# Patient Record
Sex: FEMALE | Race: WHITE
[De-identification: names, ages, dates, MRNs, and addresses within clinical notes are randomized per-mention and may not be internally consistent; named-entity substitution may affect disease eponyms.]

---

## 2017-01-25 ENCOUNTER — APPOINTMENT (OUTPATIENT)
Dept: RHEUMATOLOGY | Facility: CLINIC | Age: 27
End: 2017-01-25

## 2019-02-25 ENCOUNTER — APPOINTMENT (OUTPATIENT)
Dept: ENDOCRINOLOGY | Facility: CLINIC | Age: 29
End: 2019-02-25
Payer: COMMERCIAL

## 2019-02-25 VITALS
DIASTOLIC BLOOD PRESSURE: 80 MMHG | SYSTOLIC BLOOD PRESSURE: 120 MMHG | HEIGHT: 64 IN | BODY MASS INDEX: 28.17 KG/M2 | OXYGEN SATURATION: 98 % | HEART RATE: 70 BPM | WEIGHT: 165 LBS

## 2019-02-25 DIAGNOSIS — Z82.3 FAMILY HISTORY OF STROKE: ICD-10-CM

## 2019-02-25 DIAGNOSIS — Z83.49 FAMILY HISTORY OF OTHER ENDOCRINE, NUTRITIONAL AND METABOLIC DISEASES: ICD-10-CM

## 2019-02-25 DIAGNOSIS — Z78.9 OTHER SPECIFIED HEALTH STATUS: ICD-10-CM

## 2019-02-25 DIAGNOSIS — Z82.49 FAMILY HISTORY OF ISCHEMIC HEART DISEASE AND OTHER DISEASES OF THE CIRCULATORY SYSTEM: ICD-10-CM

## 2019-02-25 DIAGNOSIS — K59.00 CONSTIPATION, UNSPECIFIED: ICD-10-CM

## 2019-02-25 PROCEDURE — 99244 OFF/OP CNSLTJ NEW/EST MOD 40: CPT

## 2019-02-25 NOTE — HISTORY OF PRESENT ILLNESS
[FreeTextEntry1] : 28 y.o. female with h/o hypothyroidism diagnosed in 2013 presents for evaluation. At the time of diagnosis was having severe fatigue and hip pain. Not aware of AITD. However does have a sister with Hashimoto's disease. Also reports prior history of positive ALEKSANDER and saw rheumatology for evaluation. In regards to treatment, reports dose being tapered slowly over the years from 50 mcg daily to 100 mcg daily. Currently taking Synthroid 100 mcg daily on empty stomach and waits 1 hour before eating. C/o abdominal bloating and constipation. Otherwise reports good energy since starting Lexapro for anxiety/depression. Reports slow weight gain over the last few years. No heat or cold intolerance. No skin complaints. No neck complaints including dysphagia and dysphonia.

## 2019-02-25 NOTE — ASSESSMENT
[Levothyroxine] : The patient was instructed to take Levothyroxine on an empty stomach, separate from vitamins, and wait at least 30 minutes before eating [FreeTextEntry1] : 28 y.o. female with h/o hypothyroidism and constipation.\par 1.  Hypothyroidism- Discussed pathophysiology and suspect Hashimoto's disease. Patient appears euthyroid. Will check TFTs and antithyroid antibodies. Will adjust T4 accordingly to achieve goal TSH of below 2.5 to 3. Discussed thyroid disease and pregnancy. Given cost of BRAND name, will consider switch to generic with repeat TFTs in 8 weeks to assure stability. Will check thyroid ultrasound to evaluate gland.\par 2. Constipation/abdominal bloating- Encouraged hydration and increasing fiber intake. Will screen for celiac disease. Will check CMP and 25 vitamin D level.\par \par If stable, follow up in 4 months

## 2019-02-25 NOTE — CONSULT LETTER
[Dear  ___] : Dear  [unfilled], [Consult Letter:] : I had the pleasure of evaluating your patient, [unfilled]. [( Thank you for referring [unfilled] for consultation for _____ )] : Thank you for referring [unfilled] for consultation for [unfilled] [Please see my note below.] : Please see my note below. [Consult Closing:] : Thank you very much for allowing me to participate in the care of this patient.  If you have any questions, please do not hesitate to contact me. [Sincerely,] : Sincerely, [FreeTextEntry2] : Russel Rodgers DO\par [address]\[place] [address] [FreeTextEntry3] : Pankaj Osborne DO\par  of Medicine\par [facility] [employer] [facility]\par

## 2019-02-25 NOTE — PHYSICAL EXAM
[Alert] : alert [No Acute Distress] : no acute distress [Normal Sclera/Conjunctiva] : normal sclera/conjunctiva [EOMI] : extra ocular movement intact [Supple] : the neck was supple [No LAD] : no lymphadenopathy [Thyroid Not Enlarged] : the thyroid was not enlarged [No Accessory Muscle Use] : no accessory muscle use [Clear to Auscultation] : lungs were clear to auscultation bilaterally [Normal S1, S2] : normal S1 and S2 [Regular Rhythm] : with a regular rhythm [No Edema] : there was no peripheral edema [Normal Bowel Sounds] : normal bowel sounds [Not Tender] : non-tender [Soft] : abdomen soft [Not Distended] : not distended [No Clubbing, Cyanosis] : no clubbing  or cyanosis of the fingernails [No Rash] : no rash [Normal Reflexes] : deep tendon reflexes were 2+ and symmetric [Normal Affect] : the affect was normal [Normal Mood] : the mood was normal [No Thyroid Nodules] : there were no palpable thyroid nodules [No Stigmata of Cushings Syndrome] : no stigmata of cushings syndrome

## 2019-02-25 NOTE — REVIEW OF SYSTEMS
[Recent Weight Gain (___ Lbs)] : recent [unfilled] ~Ulb weight gain [Constipation] : constipation [Gas/Bloating] : gas/bloating [Negative] : Integumentary [Fatigue] : no fatigue [Polyuria] : no polyuria [Irregular Menses] : regular menses [Depression] : no depression [Anxiety] : no anxiety [Polydipsia] : no polydipsia [Cold Intolerance] : cold tolerant [Heat Intolerance] : heat tolerant [Swelling] : no swelling [de-identified] : twitching

## 2019-02-27 LAB
25(OH)D3 SERPL-MCNC: 37.9 NG/ML
ALBUMIN SERPL ELPH-MCNC: 4.4 G/DL
ALP BLD-CCNC: 55 U/L
ALT SERPL-CCNC: 7 U/L
ANION GAP SERPL CALC-SCNC: 11 MMOL/L
AST SERPL-CCNC: 14 U/L
BASOPHILS # BLD AUTO: 0.05 K/UL
BASOPHILS NFR BLD AUTO: 0.7 %
BILIRUB SERPL-MCNC: 0.2 MG/DL
BUN SERPL-MCNC: 13 MG/DL
CALCIUM SERPL-MCNC: 9.3 MG/DL
CHLORIDE SERPL-SCNC: 105 MMOL/L
CO2 SERPL-SCNC: 26 MMOL/L
CREAT SERPL-MCNC: 0.97 MG/DL
EOSINOPHIL # BLD AUTO: 0.2 K/UL
EOSINOPHIL NFR BLD AUTO: 2.8 %
GLUCOSE SERPL-MCNC: 93 MG/DL
HCT VFR BLD CALC: 41.8 %
HGB BLD-MCNC: 13.8 G/DL
IMM GRANULOCYTES NFR BLD AUTO: 0.1 %
LYMPHOCYTES # BLD AUTO: 2.9 K/UL
LYMPHOCYTES NFR BLD AUTO: 40.9 %
MAN DIFF?: NORMAL
MCHC RBC-ENTMCNC: 30.1 PG
MCHC RBC-ENTMCNC: 33 GM/DL
MCV RBC AUTO: 91.3 FL
MONOCYTES # BLD AUTO: 0.43 K/UL
MONOCYTES NFR BLD AUTO: 6.1 %
NEUTROPHILS # BLD AUTO: 3.5 K/UL
NEUTROPHILS NFR BLD AUTO: 49.4 %
PLATELET # BLD AUTO: 319 K/UL
POTASSIUM SERPL-SCNC: 4.9 MMOL/L
PROT SERPL-MCNC: 7 G/DL
RBC # BLD: 4.58 M/UL
RBC # FLD: 11.9 %
SODIUM SERPL-SCNC: 142 MMOL/L
T4 FREE SERPL-MCNC: 1.4 NG/DL
THYROGLOB AB SERPL-ACNC: <20 IU/ML
THYROPEROXIDASE AB SERPL IA-ACNC: 250 IU/ML
TSH SERPL-ACNC: 3.47 UIU/ML
TTG IGA SER IA-ACNC: <1.2 U/ML
TTG IGA SER-ACNC: NEGATIVE
TTG IGG SER IA-ACNC: 7.3 U/ML
TTG IGG SER IA-ACNC: ABNORMAL
WBC # FLD AUTO: 7.09 K/UL

## 2019-03-15 ENCOUNTER — APPOINTMENT (OUTPATIENT)
Dept: GASTROENTEROLOGY | Facility: CLINIC | Age: 29
End: 2019-03-15
Payer: COMMERCIAL

## 2019-03-15 VITALS
HEART RATE: 79 BPM | TEMPERATURE: 98.8 F | WEIGHT: 160 LBS | HEIGHT: 64 IN | SYSTOLIC BLOOD PRESSURE: 108 MMHG | DIASTOLIC BLOOD PRESSURE: 80 MMHG | BODY MASS INDEX: 27.31 KG/M2 | OXYGEN SATURATION: 97 %

## 2019-03-15 DIAGNOSIS — R14.0 ABDOMINAL DISTENSION (GASEOUS): ICD-10-CM

## 2019-03-15 DIAGNOSIS — K59.09 OTHER CONSTIPATION: ICD-10-CM

## 2019-03-15 DIAGNOSIS — Z78.9 OTHER SPECIFIED HEALTH STATUS: ICD-10-CM

## 2019-03-15 DIAGNOSIS — R12 HEARTBURN: ICD-10-CM

## 2019-03-15 DIAGNOSIS — F32.9 ANXIETY DISORDER, UNSPECIFIED: ICD-10-CM

## 2019-03-15 DIAGNOSIS — R19.8 OTHER SPECIFIED SYMPTOMS AND SIGNS INVOLVING THE DIGESTIVE SYSTEM AND ABDOMEN: ICD-10-CM

## 2019-03-15 DIAGNOSIS — F41.9 ANXIETY DISORDER, UNSPECIFIED: ICD-10-CM

## 2019-03-15 PROCEDURE — 99203 OFFICE O/P NEW LOW 30 MIN: CPT

## 2019-03-15 NOTE — PHYSICAL EXAM
[General Appearance - Alert] : alert [General Appearance - In No Acute Distress] : in no acute distress [General Appearance - Well Nourished] : well nourished [General Appearance - Well Developed] : well developed [General Appearance - Well-Appearing] : healthy appearing [Sclera] : the sclera and conjunctiva were normal [Neck Appearance] : the appearance of the neck was normal [Neck Cervical Mass (___cm)] : no neck mass was observed [Jugular Venous Distention Increased] : there was no jugular-venous distention [Auscultation Breath Sounds / Voice Sounds] : lungs were clear to auscultation bilaterally [Apical Impulse] : the apical impulse was normal [Heart Rate And Rhythm] : heart rate was normal and rhythm regular [Full Pulse] : the pedal pulses are present [Edema] : there was no peripheral edema [Bowel Sounds] : normal bowel sounds [Abdomen Soft] : soft [Abdomen Tenderness] : non-tender [] : no hepato-splenomegaly [Abdomen Mass (___ Cm)] : no abdominal mass palpated [Cervical Lymph Nodes Enlarged Posterior Bilaterally] : posterior cervical [Cervical Lymph Nodes Enlarged Anterior Bilaterally] : anterior cervical [Supraclavicular Lymph Nodes Enlarged Bilaterally] : supraclavicular [Axillary Lymph Nodes Enlarged Bilaterally] : axillary [Femoral Lymph Nodes Enlarged Bilaterally] : femoral [Inguinal Lymph Nodes Enlarged Bilaterally] : inguinal [No CVA Tenderness] : no ~M costovertebral angle tenderness [No Spinal Tenderness] : no spinal tenderness [Abnormal Walk] : normal gait [Nail Clubbing] : no clubbing  or cyanosis of the fingernails [Musculoskeletal - Swelling] : no joint swelling seen [Motor Tone] : muscle strength and tone were normal [No Focal Deficits] : no focal deficits [Oriented To Time, Place, And Person] : oriented to person, place, and time [Impaired Insight] : insight and judgment were intact [Affect] : the affect was normal

## 2019-03-15 NOTE — HISTORY OF PRESENT ILLNESS
[de-identified] : Dr. Russel lisa takes care of this pleasant 28-year-old female. She's been having quite a number of months of constipation with alterations of loose bowel movements. Sometimes the constipation loose bowel movements can occur in the same day. His abdominal bloating. She says she actually has to keep to sizes of clothes because of variations in her abdominal girth. She passes some flatus. But often feels like she cannot get the gas out. This occasional heartburn. There is no dysphagia or odynophagia. No nausea or vomiting. No true abdominal pain. Her bowel movements were loose not bloody. As no mucus. She had a tissue transglutaminase that was minimally elevated. No family history of celiac disease that she is aware of. No family history of inflammatory bowel disease. No family history of intestinal cancers. She has lost no weight.

## 2019-03-15 NOTE — REASON FOR VISIT
[Initial Evaluation] : an initial evaluation [FreeTextEntry1] : Alteration of loose bowel movements and constipation, abdominal bloating and gas, occasional heartburn and a mildly elevated antibody for celiac

## 2019-04-04 ENCOUNTER — RESULT REVIEW (OUTPATIENT)
Age: 29
End: 2019-04-04

## 2019-04-19 ENCOUNTER — OTHER (OUTPATIENT)
Age: 29
End: 2019-04-19

## 2019-04-22 ENCOUNTER — OTHER (OUTPATIENT)
Age: 29
End: 2019-04-22

## 2019-04-25 ENCOUNTER — APPOINTMENT (OUTPATIENT)
Dept: GASTROENTEROLOGY | Facility: AMBULATORY MEDICAL SERVICES | Age: 29
End: 2019-04-25
Payer: COMMERCIAL

## 2019-04-25 ENCOUNTER — OTHER (OUTPATIENT)
Age: 29
End: 2019-04-25

## 2019-04-25 DIAGNOSIS — K29.00 ACUTE GASTRITIS W/OUT BLEEDING: ICD-10-CM

## 2019-04-25 PROCEDURE — 43235 EGD DIAGNOSTIC BRUSH WASH: CPT

## 2019-04-27 RX ORDER — RANITIDINE 300 MG/1
300 TABLET ORAL
Qty: 30 | Refills: 2 | Status: DISCONTINUED | COMMUNITY
Start: 2019-04-25 | End: 2019-04-27

## 2019-04-29 ENCOUNTER — OTHER (OUTPATIENT)
Age: 29
End: 2019-04-29

## 2019-05-13 ENCOUNTER — TRANSCRIPTION ENCOUNTER (OUTPATIENT)
Age: 29
End: 2019-05-13

## 2019-08-07 ENCOUNTER — MEDICATION RENEWAL (OUTPATIENT)
Age: 29
End: 2019-08-07

## 2019-09-11 NOTE — CONSULT LETTER
What Type Of Note Output Would You Prefer (Optional)?: Standard Output Hpi Title: Evaluation of Skin Lesions [Dear  ___] : Dear  [unfilled], How Severe Are Your Spot(S)?: mild [Consult Letter:] : I had the pleasure of evaluating your patient, [unfilled]. Have Your Spot(S) Been Treated In The Past?: has not been treated [Please see my note below.] : Please see my note below. Year Removed: 1900 [Consult Closing:] : Thank you very much for allowing me to participate in the care of this patient.  If you have any questions, please do not hesitate to contact me. Additional History: She would also like a refill of spironolactone 100 mg QD, which she has been taking for years with improvement and no adverse effects. She has no blood pressure abnormalities and no kidney or electrolyte abnormalities. She is post-menopausal. [Sincerely,] : Sincerely, [FreeTextEntry2] : Russel lisa MD\par [address] [place] [address]\[place] [address]\par  [FreeTextEntry3] : Winston Díaz MD\par

## 2019-11-26 ENCOUNTER — APPOINTMENT (OUTPATIENT)
Dept: ENDOCRINOLOGY | Facility: CLINIC | Age: 29
End: 2019-11-26
Payer: COMMERCIAL

## 2019-11-26 VITALS
TEMPERATURE: 98.4 F | WEIGHT: 160 LBS | HEART RATE: 72 BPM | OXYGEN SATURATION: 95 % | BODY MASS INDEX: 27.31 KG/M2 | SYSTOLIC BLOOD PRESSURE: 114 MMHG | DIASTOLIC BLOOD PRESSURE: 78 MMHG | HEIGHT: 64 IN

## 2019-11-26 PROCEDURE — 99214 OFFICE O/P EST MOD 30 MIN: CPT | Mod: 25

## 2019-11-26 PROCEDURE — 36415 COLL VENOUS BLD VENIPUNCTURE: CPT

## 2019-11-26 NOTE — PHYSICAL EXAM
[No Acute Distress] : no acute distress [Alert] : alert [Normal Sclera/Conjunctiva] : normal sclera/conjunctiva [No LAD] : no lymphadenopathy [EOMI] : extra ocular movement intact [Supple] : the neck was supple [Thyroid Not Enlarged] : the thyroid was not enlarged [No Accessory Muscle Use] : no accessory muscle use [Normal S1, S2] : normal S1 and S2 [Clear to Auscultation] : lungs were clear to auscultation bilaterally [No Edema] : there was no peripheral edema [Regular Rhythm] : with a regular rhythm [Not Tender] : non-tender [Soft] : abdomen soft [Normal Bowel Sounds] : normal bowel sounds [Not Distended] : not distended [No Clubbing, Cyanosis] : no clubbing  or cyanosis of the fingernails [No Stigmata of Cushings Syndrome] : no stigmata of cushings syndrome [No Rash] : no rash [Normal Reflexes] : deep tendon reflexes were 2+ and symmetric [Normal Mood] : the mood was normal [Normal Affect] : the affect was normal

## 2019-11-26 NOTE — REVIEW OF SYSTEMS
[Fatigue] : fatigue [Recent Weight Loss (___ Lbs)] : recent [unfilled] ~Ulb weight loss [Constipation] : constipation [Negative] : Integumentary [Recent Weight Gain (___ Lbs)] : no recent weight gain [Gas/Bloating] : no gas/bloating [Polyuria] : no polyuria [Irregular Menses] : irregular menses [Depression] : no depression [Anxiety] : no anxiety [Polydipsia] : no polydipsia [Cold Intolerance] : cold tolerant [Heat Intolerance] : heat tolerant [Swelling] : no swelling

## 2019-11-26 NOTE — ASSESSMENT
[Levothyroxine] : The patient was instructed to take Levothyroxine on an empty stomach, separate from vitamins, and wait at least 30 minutes before eating [FreeTextEntry1] : 29 y.o. female with h/o hypothyroidism/Hashimoto's disease and thyroid nodules.\par 1.  Hypothyroidism/Hashimoto's disease- Patient appears euthyroid. Will check TFTs. Will adjust T4 accordingly to achieve goal TSH of below 2.5 to 3. Discussed thyroid disease and pregnancy. \par 2. Thyroid nodules- Will check thyroid ultrasound to evaluate. If nodules remain stable, will monitor for now. \par \par If stable, follow up in 6 months

## 2019-11-26 NOTE — HISTORY OF PRESENT ILLNESS
[FreeTextEntry1] : 29 y.o. female with h/o hypothyroidism/Hashimoto's disease diagnosed in 2013 presents for follow up visit.  At the time of diagnosis was having severe fatigue and hip pain. Does have a sister with Hashimoto's disease. Also reports prior history of positive ALEKSANDER and saw rheumatology for evaluation. In regards to treatment, reports dose being increased slowly over the years from 50 mcg daily to 100 mcg daily. Currently taking LT4 112 mcg daily on empty stomach and waits 1 hour before eating. Reports increase in appetite. Reports good energy since starting Lexapro for anxiety/depression but reports increase in fatigue now. Reports slow weight loss since last visit. No exercise. No heat or cold intolerance. No skin complaints. No neck complaints including dysphagia and dysphonia. Reports menstrual cycle is irregular without no bleeding for 4 to 6 months despite taking OCPs. Had EGD on 4/25/19 with negative biopsies for celiac disease and H. pylori and diagnosed with mild gastritis. \par \par In regards to thyroid nodules diagnosed in March 2019. Thyroid ultrasound on 3/7/19 showed normal sized gland with heterogenous echotexture. In the right lobe there is a 1.0 cm hyperechoic mid pole nodule and a 0.7 cm lower pole hypoechoic nodule. On the left there is a upper to mid pole 1.4 cm hypoechoic nodule and a mid to lower pole 0.9 cm hypoechoic nodule. No neck complaints.

## 2019-11-27 LAB
T4 FREE SERPL-MCNC: 1.3 NG/DL
TSH SERPL-ACNC: 3.89 UIU/ML

## 2020-02-20 ENCOUNTER — RX RENEWAL (OUTPATIENT)
Age: 30
End: 2020-02-20

## 2020-05-12 ENCOUNTER — APPOINTMENT (OUTPATIENT)
Dept: ENDOCRINOLOGY | Facility: CLINIC | Age: 30
End: 2020-05-12

## 2020-05-12 ENCOUNTER — RX RENEWAL (OUTPATIENT)
Age: 30
End: 2020-05-12

## 2020-08-07 ENCOUNTER — RX RENEWAL (OUTPATIENT)
Age: 30
End: 2020-08-07

## 2020-12-07 ENCOUNTER — RX RENEWAL (OUTPATIENT)
Age: 30
End: 2020-12-07

## 2021-03-21 ENCOUNTER — TRANSCRIPTION ENCOUNTER (OUTPATIENT)
Age: 31
End: 2021-03-21

## 2021-05-03 ENCOUNTER — APPOINTMENT (OUTPATIENT)
Dept: ENDOCRINOLOGY | Facility: CLINIC | Age: 31
End: 2021-05-03
Payer: COMMERCIAL

## 2021-05-03 VITALS
BODY MASS INDEX: 28.17 KG/M2 | HEART RATE: 64 BPM | OXYGEN SATURATION: 97 % | SYSTOLIC BLOOD PRESSURE: 113 MMHG | HEIGHT: 64 IN | TEMPERATURE: 98.3 F | DIASTOLIC BLOOD PRESSURE: 76 MMHG | WEIGHT: 165 LBS

## 2021-05-03 PROCEDURE — 99213 OFFICE O/P EST LOW 20 MIN: CPT

## 2021-05-03 PROCEDURE — 99072 ADDL SUPL MATRL&STAF TM PHE: CPT

## 2021-05-03 RX ORDER — RANITIDINE 150 MG/1
150 TABLET ORAL
Qty: 60 | Refills: 5 | Status: DISCONTINUED | COMMUNITY
Start: 2019-04-27 | End: 2021-05-03

## 2021-05-03 RX ORDER — FLUTICASONE PROPIONATE 50 UG/1
50 SPRAY, METERED NASAL
Qty: 16 | Refills: 0 | Status: ACTIVE | COMMUNITY
Start: 2021-02-08

## 2021-05-03 NOTE — DATA REVIEWED
[FreeTextEntry1] : Labs\par 4/8/21\par H/H 14.2/44.4\par glucose 87\par BUN/cr 14/0.93\par calcium 9.9\par TSH 2.91 Free T4 1.3\par

## 2021-05-03 NOTE — HISTORY OF PRESENT ILLNESS
[FreeTextEntry1] : 31 y.o. female with h/o hypothyroidism/Hashimoto's disease diagnosed in 2013 presents for follow up visit.  At the time of diagnosis was having severe fatigue and hip pain. Does have a sister with Hashimoto's disease. Also reports prior history of positive ALEKSANDER and saw rheumatology for evaluation. In regards to treatment, reports dose being increased slowly over the years from 50 mcg daily to 100 mcg daily. Currently taking LT4 112 mcg daily on empty stomach and waits 1 hour before eating. Reports good energy since starting Lexapro for anxiety/depression. Reports weight is stable since last visit. No exercise but does walking. No heat or cold intolerance. No skin complaints. No neck complaints including dysphagia and dysphonia. Off OCPs and mood is better. Menstrual cycles are regular and does get cramps. Had EGD on 4/25/19 with negative biopsies for celiac disease and H. pylori and diagnosed with mild gastritis. Reports less abdominal pain and bloating. Better since eliminating dairy. \par \par In regards to thyroid nodules diagnosed in March 2019. Thyroid ultrasound on 3/7/19 showed normal sized gland with heterogenous echotexture. In the right lobe there is a 1.0 cm hyperechoic mid pole nodule and a 0.7 cm lower pole hypoechoic nodule. On the left there is a upper to mid pole 1.4 cm hypoechoic nodule and a mid to lower pole 0.9 cm hypoechoic nodule. No neck complaints. Thyroid ultrasound in August 2020 shows right mid pole nodule 1.2 cm and right lower pole nodule 0.7 cm and left upper pole nodule 1.3 cm and left lower pole nodule 0.9 cm. No abnormal LNs.

## 2021-05-03 NOTE — REVIEW OF SYSTEMS
[Fatigue] : no fatigue [Recent Weight Gain (___ Lbs)] : no recent weight gain [Recent Weight Loss (___ Lbs)] : no recent weight loss [Constipation] : no constipation [Abdominal Pain] : no abdominal pain [Diarrhea] : no diarrhea [Gas/Bloating] : no gas/bloating [Irregular Menses] : regular menses [Joint Pain] : no joint pain [Dry Skin] : no dry skin [Hair Loss] : no hair loss [Headaches] : no headaches [Polydipsia] : no polydipsia [Cold Intolerance] : no cold intolerance [Heat Intolerance] : no heat intolerance [Swelling] : no swelling [Negative] : Psychiatric

## 2021-05-03 NOTE — ASSESSMENT
[Levothyroxine] : The patient was instructed to take Levothyroxine on an empty stomach, separate from vitamins, and wait at least 30 minutes before eating [FreeTextEntry1] : 31 y.o. female with h/o hypothyroidism/Hashimoto's disease and thyroid nodules.\par \par 1.  Hypothyroidism/Hashimoto's disease- Patient is euthyroid. Will continue LT4 112 mcg daily. Discussed thyroid disease and pregnancy. \par \par 2. Thyroid nodules- Thyroid nodules are stable. Will continue to monitor for now. Will check thyroid ultrasound in 6 months. \par \par If stable, follow up in 6 months

## 2021-05-03 NOTE — PHYSICAL EXAM
[Alert] : alert [No Acute Distress] : no acute distress [Normal Sclera/Conjunctiva] : normal sclera/conjunctiva [EOMI] : extra ocular movement intact [No LAD] : no lymphadenopathy [Thyroid Not Enlarged] : the thyroid was not enlarged [No Respiratory Distress] : no respiratory distress [Clear to Auscultation] : lungs were clear to auscultation bilaterally [Normal S1, S2] : normal S1 and S2 [Regular Rhythm] : with a regular rhythm [No Edema] : no peripheral edema [Normal Bowel Sounds] : normal bowel sounds [Not Tender] : non-tender [Not Distended] : not distended [Soft] : abdomen soft [Normal Anterior Cervical Nodes] : no anterior cervical lymphadenopathy [No Clubbing, Cyanosis] : no clubbing  or cyanosis of the fingernails [No Rash] : no rash [Normal Reflexes] : deep tendon reflexes were 2+ and symmetric [Normal Affect] : the affect was normal [Normal Mood] : the mood was normal

## 2021-07-18 ENCOUNTER — RX RENEWAL (OUTPATIENT)
Age: 31
End: 2021-07-18

## 2021-10-11 ENCOUNTER — APPOINTMENT (OUTPATIENT)
Dept: ENDOCRINOLOGY | Facility: CLINIC | Age: 31
End: 2021-10-11
Payer: COMMERCIAL

## 2021-10-11 VITALS
HEIGHT: 64 IN | BODY MASS INDEX: 28.42 KG/M2 | TEMPERATURE: 98.8 F | SYSTOLIC BLOOD PRESSURE: 112 MMHG | OXYGEN SATURATION: 97 % | HEART RATE: 77 BPM | DIASTOLIC BLOOD PRESSURE: 74 MMHG | WEIGHT: 166.44 LBS

## 2021-10-11 DIAGNOSIS — E03.9 HYPOTHYROIDISM, UNSPECIFIED: ICD-10-CM

## 2021-10-11 PROCEDURE — 36415 COLL VENOUS BLD VENIPUNCTURE: CPT

## 2021-10-11 PROCEDURE — 99213 OFFICE O/P EST LOW 20 MIN: CPT | Mod: 25

## 2021-10-11 RX ORDER — AZELASTINE HYDROCHLORIDE 0.5 MG/ML
0.05 SOLUTION/ DROPS OPHTHALMIC
Qty: 6 | Refills: 0 | Status: DISCONTINUED | COMMUNITY
Start: 2021-06-09

## 2021-10-11 NOTE — REVIEW OF SYSTEMS
[Negative] : Respiratory [Fatigue] : no fatigue [Recent Weight Gain (___ Lbs)] : no recent weight gain [Recent Weight Loss (___ Lbs)] : no recent weight loss [Constipation] : no constipation [Abdominal Pain] : no abdominal pain [Diarrhea] : no diarrhea [Gas/Bloating] : no gas/bloating [Irregular Menses] : regular menses [Joint Pain] : no joint pain [Dry Skin] : no dry skin [Hair Loss] : no hair loss [Headaches] : no headaches [Anxiety] : anxiety [Polydipsia] : no polydipsia [Cold Intolerance] : no cold intolerance [Heat Intolerance] : no heat intolerance [Swelling] : no swelling

## 2021-10-11 NOTE — ASSESSMENT
[Levothyroxine] : The patient was instructed to take Levothyroxine on an empty stomach, separate from vitamins, and wait at least 30 minutes before eating [FreeTextEntry1] : 31 y.o. female with h/o hypothyroidism/Hashimoto's disease and thyroid nodules.\par \par 1.  Hypothyroidism/Hashimoto's disease- Patient appears euthyroid. Will continue LT4 112 mcg daily for now. Will check TFTs today. \par \par 2. Thyroid nodules- Thyroid nodules have been stable. Will continue to monitor for now. Will check thyroid ultrasound now. \par \par If stable, follow up in 6 months\par Labs drawn in the office

## 2021-10-11 NOTE — HISTORY OF PRESENT ILLNESS
[FreeTextEntry1] : 31 y.o. female with h/o hypothyroidism/Hashimoto's disease diagnosed in 2013 presents for follow up visit.  At the time of diagnosis was having severe fatigue and hip pain. Does have a sister with Hashimoto's disease. Also reports prior history of positive ALEKSANDER and saw rheumatology for evaluation. In regards to treatment, reports dose being increased slowly over the years from 50 mcg daily to 112 mcg daily. Currently taking LT4 112 mcg daily on empty stomach and waits 1 hour before eating. Reports good energy since starting Lexapro for anxiety/depression. Reports increase in anxiety secondary to mother's passing. Reports weight is stable since last visit. No exercise but does walking. No heat or cold intolerance. No skin complaints. Normal bowel movements. No neck complaints including dysphagia and dysphonia. Off OCPs and mood is better. Menstrual cycles are regular and does get cramps. Had EGD on 4/25/19 with negative biopsies for celiac disease and H. pylori and diagnosed with mild gastritis. Reports less abdominal pain and bloating when eliminating dairy and gluten. \par \par In regards to thyroid nodules diagnosed in March 2019. Thyroid ultrasound on 3/7/19 showed normal sized gland with heterogenous echotexture. In the right lobe there is a 1.0 cm hyperechoic mid pole nodule and a 0.7 cm lower pole hypoechoic nodule. On the left there is a upper to mid pole 1.4 cm hypoechoic nodule and a mid to lower pole 0.9 cm hypoechoic nodule. No neck complaints. Thyroid ultrasound in August 2020 shows right mid pole nodule 1.2 cm and right lower pole nodule 0.7 cm and left upper pole nodule 1.3 cm and left lower pole nodule 0.9 cm. No abnormal LNs.

## 2021-10-12 LAB
T4 FREE SERPL-MCNC: 1.4 NG/DL
TSH SERPL-ACNC: 1.14 UIU/ML

## 2022-01-31 NOTE — ASSESSMENT
[FreeTextEntry1] : Impression\par \par Ulceration in bowel movement with periods of constipation and loose bowel movements, sometimes in the same day\par \par Abdominal bloating\par \par Heartburn\par \par Mildly elevated tissue transglutaminase\par \par Suggest\par \par Try low simethicone\par \par Trial of probiotic\par \par Trial of Equalactin tablets\par \par Upper endoscopy with small bowel biopsies\par \par Colonoscopy with biopsies to look for lymphocytic and collagenous colitis\par \par Suprep\par \par Risks/benefits:\par The procedure, the risks and benefits and alternatives have been reviewed in great detail with the patient.  Risks including, but not limited to sedation such as cardiac and pulmonary compromise, the procedure itself such as bleeding requiring hospitalization, transfusion, surgery, temporary or permanent colostomy.  Perforation or puncture of the requiring hospitalization, surgery, temporary colostomy.\par It has been explained to the patient that though colonoscopy is thought to be the best screening exam for colon cancer and polyps, no screening exam can find all colon polyps or cancers.  \par The patient expresses understanding of the procedure and consents to undergoing the procedure.\par \par 
some shortness of breath at baseline related to her asthma. Otherwise ROS is unremarkable

## 2022-02-11 ENCOUNTER — RX RENEWAL (OUTPATIENT)
Age: 32
End: 2022-02-11

## 2022-08-04 ENCOUNTER — RX RENEWAL (OUTPATIENT)
Age: 32
End: 2022-08-04

## 2022-08-23 ENCOUNTER — APPOINTMENT (OUTPATIENT)
Dept: ENDOCRINOLOGY | Facility: CLINIC | Age: 32
End: 2022-08-23

## 2022-08-23 VITALS
BODY MASS INDEX: 28.51 KG/M2 | WEIGHT: 167 LBS | OXYGEN SATURATION: 98 % | DIASTOLIC BLOOD PRESSURE: 74 MMHG | HEIGHT: 64 IN | TEMPERATURE: 97.8 F | SYSTOLIC BLOOD PRESSURE: 107 MMHG | HEART RATE: 71 BPM

## 2022-08-23 DIAGNOSIS — E06.3 AUTOIMMUNE THYROIDITIS: ICD-10-CM

## 2022-08-23 DIAGNOSIS — E04.2 NONTOXIC MULTINODULAR GOITER: ICD-10-CM

## 2022-08-23 PROCEDURE — 36415 COLL VENOUS BLD VENIPUNCTURE: CPT

## 2022-08-23 PROCEDURE — 99213 OFFICE O/P EST LOW 20 MIN: CPT | Mod: 25

## 2022-08-23 RX ORDER — SODIUM SULFATE, POTASSIUM SULFATE, MAGNESIUM SULFATE 17.5; 3.13; 1.6 G/ML; G/ML; G/ML
17.5-3.13-1.6 SOLUTION, CONCENTRATE ORAL
Qty: 1 | Refills: 0 | Status: DISCONTINUED | COMMUNITY
Start: 2019-03-15 | End: 2022-08-23

## 2022-08-23 RX ORDER — SIMETHICONE 80 MG/1
80 TABLET, CHEWABLE ORAL
Qty: 120 | Refills: 0 | Status: DISCONTINUED | COMMUNITY
Start: 2019-03-15 | End: 2022-08-23

## 2022-08-23 RX ORDER — ESCITALOPRAM OXALATE 5 MG/1
5 TABLET ORAL DAILY
Refills: 0 | Status: DISCONTINUED | COMMUNITY
Start: 2019-02-25 | End: 2022-08-23

## 2022-08-23 RX ORDER — ESCITALOPRAM OXALATE 20 MG/1
20 TABLET ORAL
Qty: 30 | Refills: 0 | Status: ACTIVE | COMMUNITY
Start: 2019-02-25

## 2022-08-23 RX ORDER — VENLAFAXINE HYDROCHLORIDE 37.5 MG/1
37.5 CAPSULE, EXTENDED RELEASE ORAL
Qty: 90 | Refills: 0 | Status: ACTIVE | COMMUNITY
Start: 2022-08-23

## 2022-08-23 RX ORDER — ALPRAZOLAM 0.5 MG/1
0.5 TABLET ORAL
Refills: 0 | Status: ACTIVE | COMMUNITY
Start: 2022-08-23

## 2022-08-23 RX ORDER — CALCIUM POLYCARBOPHIL 500 MG
625 TABLET,CHEWABLE ORAL DAILY
Qty: 60 | Refills: 0 | Status: DISCONTINUED | COMMUNITY
Start: 2019-03-15 | End: 2022-08-23

## 2022-08-23 RX ORDER — L.ACID,FERM,PLA,RHA/B.BIF,LONG 126 MG
TABLET, DELAYED AND EXTENDED RELEASE ORAL
Qty: 30 | Refills: 0 | Status: DISCONTINUED | COMMUNITY
Start: 2019-03-15 | End: 2022-08-23

## 2022-08-23 NOTE — REVIEW OF SYSTEMS
[Anxiety] : anxiety [Negative] : Respiratory [Fatigue] : no fatigue [Recent Weight Gain (___ Lbs)] : no recent weight gain [Recent Weight Loss (___ Lbs)] : no recent weight loss [Constipation] : no constipation [Abdominal Pain] : no abdominal pain [Diarrhea] : no diarrhea [Gas/Bloating] : no gas/bloating [Irregular Menses] : regular menses [Joint Pain] : no joint pain [Dry Skin] : no dry skin [Hair Loss] : no hair loss [Headaches] : no headaches [Polydipsia] : no polydipsia [Cold Intolerance] : no cold intolerance [Heat Intolerance] : no heat intolerance [Swelling] : no swelling

## 2022-08-23 NOTE — HISTORY OF PRESENT ILLNESS
[FreeTextEntry1] : 32 y.o. female with h/o hypothyroidism/Hashimoto's disease diagnosed in 2013 presents for follow up visit.  At the time of diagnosis was having severe fatigue and hip pain. Does have a sister with Hashimoto's disease. Also reports prior history of positive ALEKSANDER and saw rheumatology for evaluation. In regards to treatment, reports dose being increased slowly over the years from 50 mcg daily to 112 mcg daily. Currently taking LT4 112 mcg daily on empty stomach and waits 1 hour before eating. Reports good energy since starting Lexapro for anxiety/depression. Had panic attack in March 2022. Reports weight is stable since last visit. No exercise but does walking. No heat or cold intolerance. No skin complaints. Normal bowel movements. No neck complaints including dysphagia and dysphonia. Off OCPs and mood is better. Menstrual cycles are regular and does get cramps. Had EGD on 4/25/19 with negative biopsies for celiac disease and H. pylori and diagnosed with mild gastritis. Reports less abdominal pain and bloating when eliminating dairy and gluten. \par \par In regards to thyroid nodules diagnosed in March 2019 and no neck complaints. No head or neck RT exposure. \par \par Thyroid ultrasound on 3/7/19 showed normal sized gland with heterogenous echotexture. In the right lobe there is a 1.0 cm hyperechoic mid pole nodule and a 0.7 cm lower pole hypoechoic nodule. On the left there is a upper to mid pole 1.4 cm hypoechoic nodule and a mid to lower pole 0.9 cm hypoechoic nodule. \par Thyroid ultrasound in August 2020 shows right mid pole nodule 1.2 cm and right lower pole nodule 0.7 cm and left upper pole nodule 1.3 cm and left lower pole nodule 0.9 cm. No abnormal LNs. \par Thyroid ultrasound in November 2021 shows stable b/l nodules with largest on the right measuring 0.9 cm and largest on the left measuring 1.2 cm..

## 2022-08-23 NOTE — ASSESSMENT
[Levothyroxine] : The patient was instructed to take Levothyroxine on an empty stomach, separate from vitamins, and wait at least 30 minutes before eating [FreeTextEntry1] : 32 y.o. female with h/o hypothyroidism/Hashimoto's disease and thyroid nodules.\par \par 1.  Hypothyroidism/Hashimoto's disease- Patient appears euthyroid. Will continue LT4 112 mcg daily for now. Will check TFTs today. \par \par 2. Thyroid nodules- Thyroid nodules have been stable. Will continue to monitor for now. Will check thyroid ultrasound in 11/2022. \par \par If stable, follow up in 1 year\par Labs drawn in the office today

## 2022-08-24 LAB
25(OH)D3 SERPL-MCNC: 46.5 NG/ML
ALBUMIN SERPL ELPH-MCNC: 4.8 G/DL
ALP BLD-CCNC: 87 U/L
ALT SERPL-CCNC: 34 U/L
ANION GAP SERPL CALC-SCNC: 11 MMOL/L
AST SERPL-CCNC: 27 U/L
BASOPHILS # BLD AUTO: 0.07 K/UL
BASOPHILS NFR BLD AUTO: 1.1 %
BILIRUB SERPL-MCNC: 0.2 MG/DL
BUN SERPL-MCNC: 14 MG/DL
CALCIUM SERPL-MCNC: 9.4 MG/DL
CHLORIDE SERPL-SCNC: 100 MMOL/L
CHOLEST SERPL-MCNC: 168 MG/DL
CO2 SERPL-SCNC: 26 MMOL/L
CREAT SERPL-MCNC: 0.78 MG/DL
EGFR: 102 ML/MIN/1.73M2
EOSINOPHIL # BLD AUTO: 0.15 K/UL
EOSINOPHIL NFR BLD AUTO: 2.3 %
GLUCOSE SERPL-MCNC: 68 MG/DL
HCT VFR BLD CALC: 45 %
HDLC SERPL-MCNC: 62 MG/DL
HGB BLD-MCNC: 14.3 G/DL
IMM GRANULOCYTES NFR BLD AUTO: 0.5 %
LDLC SERPL CALC-MCNC: 88 MG/DL
LYMPHOCYTES # BLD AUTO: 2.47 K/UL
LYMPHOCYTES NFR BLD AUTO: 38.1 %
MAN DIFF?: NORMAL
MCHC RBC-ENTMCNC: 30.5 PG
MCHC RBC-ENTMCNC: 31.8 GM/DL
MCV RBC AUTO: 95.9 FL
MONOCYTES # BLD AUTO: 0.4 K/UL
MONOCYTES NFR BLD AUTO: 6.2 %
NEUTROPHILS # BLD AUTO: 3.37 K/UL
NEUTROPHILS NFR BLD AUTO: 51.8 %
NONHDLC SERPL-MCNC: 106 MG/DL
PLATELET # BLD AUTO: 334 K/UL
POTASSIUM SERPL-SCNC: 4.4 MMOL/L
PROT SERPL-MCNC: 7.2 G/DL
RBC # BLD: 4.69 M/UL
RBC # FLD: 12.6 %
SODIUM SERPL-SCNC: 138 MMOL/L
T4 FREE SERPL-MCNC: 1.4 NG/DL
TRIGL SERPL-MCNC: 89 MG/DL
TSH SERPL-ACNC: 2.02 UIU/ML
WBC # FLD AUTO: 6.49 K/UL

## 2023-01-02 ENCOUNTER — RX RENEWAL (OUTPATIENT)
Age: 33
End: 2023-01-02

## 2023-01-14 ENCOUNTER — APPOINTMENT (OUTPATIENT)
Dept: DERMATOLOGY | Facility: CLINIC | Age: 33
End: 2023-01-14

## 2023-03-24 LAB
T4 FREE SERPL-MCNC: 1.3 NG/DL
TSH SERPL-ACNC: 2.65 UIU/ML

## 2023-08-28 ENCOUNTER — APPOINTMENT (OUTPATIENT)
Dept: ENDOCRINOLOGY | Facility: CLINIC | Age: 33
End: 2023-08-28

## 2023-11-27 ENCOUNTER — RX RENEWAL (OUTPATIENT)
Age: 33
End: 2023-11-27

## 2024-01-18 ENCOUNTER — NON-APPOINTMENT (OUTPATIENT)
Age: 34
End: 2024-01-18

## 2024-06-15 ENCOUNTER — RX RENEWAL (OUTPATIENT)
Age: 34
End: 2024-06-15

## 2024-06-15 RX ORDER — LEVOTHYROXINE SODIUM 0.11 MG/1
112 TABLET ORAL
Qty: 30 | Refills: 5 | Status: ACTIVE | COMMUNITY
Start: 2019-02-27 | End: 1900-01-01

## 2024-09-06 ENCOUNTER — APPOINTMENT (OUTPATIENT)
Dept: ENDOCRINOLOGY | Facility: CLINIC | Age: 34
End: 2024-09-06

## 2024-11-10 ENCOUNTER — NON-APPOINTMENT (OUTPATIENT)
Age: 34
End: 2024-11-10

## 2024-12-17 ENCOUNTER — RX RENEWAL (OUTPATIENT)
Age: 34
End: 2024-12-17

## 2025-01-21 DIAGNOSIS — E66.3 OVERWEIGHT: ICD-10-CM

## 2025-02-14 ENCOUNTER — APPOINTMENT (OUTPATIENT)
Dept: ENDOCRINOLOGY | Facility: CLINIC | Age: 35
End: 2025-02-14
Payer: COMMERCIAL

## 2025-02-14 VITALS
HEART RATE: 94 BPM | WEIGHT: 185 LBS | BODY MASS INDEX: 31.58 KG/M2 | HEIGHT: 64 IN | OXYGEN SATURATION: 98 % | DIASTOLIC BLOOD PRESSURE: 70 MMHG | SYSTOLIC BLOOD PRESSURE: 120 MMHG

## 2025-02-14 DIAGNOSIS — E06.3 AUTOIMMUNE THYROIDITIS: ICD-10-CM

## 2025-02-14 DIAGNOSIS — E04.2 NONTOXIC MULTINODULAR GOITER: ICD-10-CM

## 2025-02-14 DIAGNOSIS — E66.3 OVERWEIGHT: ICD-10-CM

## 2025-02-14 PROCEDURE — 99214 OFFICE O/P EST MOD 30 MIN: CPT

## 2025-02-14 RX ORDER — LEVONORGESTREL AND ETHINYL ESTRADIOL 0.15-0.03
0.15-3 KIT ORAL
Refills: 0 | Status: ACTIVE | COMMUNITY

## 2025-03-18 ENCOUNTER — RESULT REVIEW (OUTPATIENT)
Age: 35
End: 2025-03-18

## 2025-03-18 ENCOUNTER — APPOINTMENT (OUTPATIENT)
Dept: ULTRASOUND IMAGING | Facility: IMAGING CENTER | Age: 35
End: 2025-03-18
Payer: COMMERCIAL

## 2025-03-18 ENCOUNTER — OUTPATIENT (OUTPATIENT)
Dept: OUTPATIENT SERVICES | Facility: HOSPITAL | Age: 35
LOS: 1 days | End: 2025-03-18
Payer: COMMERCIAL

## 2025-03-18 DIAGNOSIS — E04.2 NONTOXIC MULTINODULAR GOITER: ICD-10-CM

## 2025-03-18 PROCEDURE — 10005 FNA BX W/US GDN 1ST LES: CPT

## 2025-03-18 PROCEDURE — 88172 CYTP DX EVAL FNA 1ST EA SITE: CPT

## 2025-03-18 PROCEDURE — 88173 CYTOPATH EVAL FNA REPORT: CPT | Mod: 26

## 2025-03-18 PROCEDURE — 88173 CYTOPATH EVAL FNA REPORT: CPT

## 2025-03-20 ENCOUNTER — TRANSCRIPTION ENCOUNTER (OUTPATIENT)
Age: 35
End: 2025-03-20

## 2025-05-12 ENCOUNTER — APPOINTMENT (OUTPATIENT)
Dept: DERMATOLOGY | Facility: CLINIC | Age: 35
End: 2025-05-12
Payer: COMMERCIAL

## 2025-05-12 VITALS — BODY MASS INDEX: 30.73 KG/M2 | HEIGHT: 64 IN | WEIGHT: 180 LBS

## 2025-05-12 DIAGNOSIS — Z80.8 FAMILY HISTORY OF MALIGNANT NEOPLASM OF OTHER ORGANS OR SYSTEMS: ICD-10-CM

## 2025-05-12 DIAGNOSIS — L73.2 HIDRADENITIS SUPPURATIVA: ICD-10-CM

## 2025-05-12 DIAGNOSIS — L70.0 ACNE VULGARIS: ICD-10-CM

## 2025-05-12 PROCEDURE — 99204 OFFICE O/P NEW MOD 45 MIN: CPT

## 2025-05-12 RX ORDER — CLINDAMYCIN PHOSPHATE 10 MG/ML
1 LOTION TOPICAL TWICE DAILY
Qty: 1 | Refills: 3 | Status: ACTIVE | COMMUNITY
Start: 2025-05-12 | End: 1900-01-01

## 2025-05-12 RX ORDER — TRETINOIN 0.25 MG/G
0.03 CREAM TOPICAL
Qty: 1 | Refills: 3 | Status: ACTIVE | COMMUNITY
Start: 2025-05-12 | End: 1900-01-01

## 2025-05-29 ENCOUNTER — TRANSCRIPTION ENCOUNTER (OUTPATIENT)
Age: 35
End: 2025-05-29